# Patient Record
(demographics unavailable — no encounter records)

---

## 2024-10-21 NOTE — HISTORY OF PRESENT ILLNESS
[FreeTextEntry1] : 19 year old with Graves' disease, currently in remission, presents for follow up  Interim history Patient feels well, had a good summer  Denies any symptoms of overt hyperthyroidism TSH 1.2, FT4 1.5  Pt has lost 8 pounds in 10 months   #Graves' disease -Diagnosed December 2017 (typical symptoms significant weight loss, fine tremor) -Medications methimazole (treatment for Jan 2018 -> stopped sep 2020) -> negative TSI/Trab, in remission -Never had HOANG -Non smoker -No ETOH  Denies any symptoms of overt hyperthyroidism  #Familial neutropenia -Mild -Seen by heme - Advised to re-refer if ANC <700 (0.7) on 2x separate occasions  Medications -Vit D 2000IU 3-4x per week -Fish oil  Labs from July 2023 5.6% Total T3 123 TSH 0.69 FT4 1.4 TSI 0.46 (0-0.55) Trab <1.10  [employer] 3 year course -> graduating next week ([employer])

## 2024-10-21 NOTE — PHYSICAL EXAM
[de-identified] : General: Patient appears well nourished without any distress  HEENT: Thyroid is supple, no nodules palpated, no LAD